# Patient Record
Sex: FEMALE | Race: OTHER | HISPANIC OR LATINO
[De-identification: names, ages, dates, MRNs, and addresses within clinical notes are randomized per-mention and may not be internally consistent; named-entity substitution may affect disease eponyms.]

---

## 2024-01-17 ENCOUNTER — NON-APPOINTMENT (OUTPATIENT)
Age: 39
End: 2024-01-17

## 2024-01-17 PROBLEM — Z00.00 ENCOUNTER FOR PREVENTIVE HEALTH EXAMINATION: Status: ACTIVE | Noted: 2024-01-17

## 2024-01-19 ENCOUNTER — OUTPATIENT (OUTPATIENT)
Dept: OUTPATIENT SERVICES | Facility: HOSPITAL | Age: 39
LOS: 1 days | End: 2024-01-19
Payer: COMMERCIAL

## 2024-01-19 ENCOUNTER — RESULT REVIEW (OUTPATIENT)
Age: 39
End: 2024-01-19

## 2024-01-19 ENCOUNTER — APPOINTMENT (OUTPATIENT)
Dept: PHYSICAL MEDICINE AND REHAB | Facility: CLINIC | Age: 39
End: 2024-01-19
Payer: COMMERCIAL

## 2024-01-19 VITALS — TEMPERATURE: 97.1 F | DIASTOLIC BLOOD PRESSURE: 81 MMHG | SYSTOLIC BLOOD PRESSURE: 119 MMHG

## 2024-01-19 DIAGNOSIS — Z87.898 PERSONAL HISTORY OF OTHER SPECIFIED CONDITIONS: ICD-10-CM

## 2024-01-19 DIAGNOSIS — Z87.09 PERSONAL HISTORY OF OTHER DISEASES OF THE RESPIRATORY SYSTEM: ICD-10-CM

## 2024-01-19 DIAGNOSIS — Z80.3 FAMILY HISTORY OF MALIGNANT NEOPLASM OF BREAST: ICD-10-CM

## 2024-01-19 DIAGNOSIS — Z80.0 FAMILY HISTORY OF MALIGNANT NEOPLASM OF DIGESTIVE ORGANS: ICD-10-CM

## 2024-01-19 DIAGNOSIS — Z82.62 FAMILY HISTORY OF OSTEOPOROSIS: ICD-10-CM

## 2024-01-19 DIAGNOSIS — Z86.79 PERSONAL HISTORY OF OTHER DISEASES OF THE CIRCULATORY SYSTEM: ICD-10-CM

## 2024-01-19 PROCEDURE — 72072 X-RAY EXAM THORAC SPINE 3VWS: CPT

## 2024-01-19 PROCEDURE — 72072 X-RAY EXAM THORAC SPINE 3VWS: CPT | Mod: 26

## 2024-01-19 PROCEDURE — 73010 X-RAY EXAM OF SHOULDER BLADE: CPT

## 2024-01-19 PROCEDURE — 73030 X-RAY EXAM OF SHOULDER: CPT

## 2024-01-19 PROCEDURE — 72052 X-RAY EXAM NECK SPINE 6/>VWS: CPT

## 2024-01-19 PROCEDURE — 99205 OFFICE O/P NEW HI 60 MIN: CPT

## 2024-01-19 PROCEDURE — 72052 X-RAY EXAM NECK SPINE 6/>VWS: CPT | Mod: 26

## 2024-01-19 PROCEDURE — 73030 X-RAY EXAM OF SHOULDER: CPT | Mod: 26,RT

## 2024-01-19 PROCEDURE — 73010 X-RAY EXAM OF SHOULDER BLADE: CPT | Mod: 26,RT

## 2024-01-19 RX ORDER — METHOCARBAMOL 750 MG/1
750 TABLET, FILM COATED ORAL 3 TIMES DAILY
Qty: 84 | Refills: 1 | Status: ACTIVE | COMMUNITY
Start: 2024-01-19 | End: 1900-01-01

## 2024-01-19 RX ORDER — SIMVASTATIN 20 MG/1
20 TABLET, FILM COATED ORAL
Refills: 0 | Status: ACTIVE | COMMUNITY

## 2024-01-19 RX ORDER — LISINOPRIL 20 MG/1
20 TABLET ORAL
Refills: 0 | Status: ACTIVE | COMMUNITY

## 2024-01-19 RX ORDER — METHYLPREDNISOLONE 4 MG/1
4 TABLET ORAL
Qty: 1 | Refills: 0 | Status: ACTIVE | COMMUNITY
Start: 2024-01-19 | End: 1900-01-01

## 2024-01-20 ENCOUNTER — TRANSCRIPTION ENCOUNTER (OUTPATIENT)
Age: 39
End: 2024-01-20

## 2024-02-02 ENCOUNTER — APPOINTMENT (OUTPATIENT)
Dept: PHYSICAL MEDICINE AND REHAB | Facility: CLINIC | Age: 39
End: 2024-02-02
Payer: COMMERCIAL

## 2024-02-02 VITALS
HEART RATE: 99 BPM | OXYGEN SATURATION: 97 % | DIASTOLIC BLOOD PRESSURE: 84 MMHG | WEIGHT: 220 LBS | TEMPERATURE: 98 F | BODY MASS INDEX: 35.36 KG/M2 | HEIGHT: 66 IN | SYSTOLIC BLOOD PRESSURE: 125 MMHG

## 2024-02-02 DIAGNOSIS — M53.84 OTHER SPECIFIED DORSOPATHIES, THORACIC REGION: ICD-10-CM

## 2024-02-02 DIAGNOSIS — M75.51 BURSITIS OF RIGHT SHOULDER: ICD-10-CM

## 2024-02-02 DIAGNOSIS — G25.89 OTHER SPECIFIED EXTRAPYRAMIDAL AND MOVEMENT DISORDERS: ICD-10-CM

## 2024-02-02 DIAGNOSIS — M54.12 RADICULOPATHY, CERVICAL REGION: ICD-10-CM

## 2024-02-02 DIAGNOSIS — M25.311 OTHER INSTABILITY, RIGHT SHOULDER: ICD-10-CM

## 2024-02-02 DIAGNOSIS — M79.18 MYALGIA, OTHER SITE: ICD-10-CM

## 2024-02-02 DIAGNOSIS — S46.819A STRAIN OF OTHER MUSCLES, FASCIA AND TENDONS AT SHOULDER AND UPPER ARM LEVEL, UNSPECIFIED ARM, INITIAL ENCOUNTER: ICD-10-CM

## 2024-02-02 PROCEDURE — 99214 OFFICE O/P EST MOD 30 MIN: CPT

## 2024-02-05 NOTE — PHYSICAL EXAM
[FreeTextEntry1] : Gen: A+O x 3 in NAD Psych: Normal mood and affect. Responds appropriately to commands Eyes: Anicteric. No discharge. EOMI. Resp: Breathing unlabored CV: radial pulses 2+ and equal. No varicosities noted Ext: No c/c/e Skin: No lesions noted   Gait: Non antalgic +  reciprocating heel to toe able to stand on toes and heels WITH hand holding/holding onto counter with both hands Tandem gait intact WITH hand holding Able to stand on either lower limb without LOB for 15 seconds Romberg negative   CN2-12 grossly intact  Inspection: Spine alignment is midline, with no evidence of scoliosis. Iliac crest heights and PSIS heights level.  + Forward head position.   Palpation: There is + tenderness over the upper traps, middle traps, levator scaps, rhomboids, articular pillars, cervical paraspinals, B/L   Cervical ROM: Flexion, extension, side-bending, rotation, limited due to pain with most pain at terminal ROM Finger to nose bilaterally intact    C5 (Shoulder Abduction)        C5 (Elbow Flex)         C6 (Wrist Ext)  Right 3-4/5                                 4/5                           5/5       Left 5/5                                 5/5                           5/5             C7 (Elbow Ext)            C7 (Wrist Flex)             C8 (Long Finger Flex)          T1 (Finger Abduct)          Right 4/5                     5/5                                      5/5                                       5/5                                                 Left 5/5                     5/5                                      5/5                                       5/5                                  C8 (Thumb Ext)            C8 & T1 (Thumb Opp)           Right 5/5                            5/5                                                 Left 5/5                            5/5                              Tone: Normal. No cog wheeling. Proprioception at DIPs intact B/L Sensation: Grossly intact to light touch and pinprick bilateral upper extremities. Reflexes: 2+ symmetric biceps, pronators, brachioradialis, triceps Hoffmans Sign negative Spurling's Sign negative Shoulder Abduction Test (Bakody's) absent Lhermittes Sign negative   RIGHT SHOULDER: neg effusion neg scars or lacerations or lesions neg increased warmth neg scapular winging + scapular dyskinesis (Type 2) neg muscle atrophy   Palpation: no TTP over sterna-clavicular joint no TTP over clavicle no TTP over coracoid process no TTP over sternum no TTP over AC joint no TTP over humerus + TTP over the spine of the scapula + TTP over the medial border of the scapula, superior angle, inferior angle, lateral border + TTP over supraspinatus no TTP over infraspinatus + TTP over upper trapezius + TTP over deltoid muscle no TTP in the axilla + crepitus with PROM shoulder   AROM: Active range of motion limited in most planes as below Scapulohumeral rhythm abnormal: + slowed, interrupted, aided by compensatory movements contralaterally as below   Abduction 140/170-180 Forward Flexion 140/170-180 Elevation through the plane of the scapula 140/170-180 External Rotation 40/80-90 Internal Rotation 40/ Extension 25/50-60 Adduction 30/50-75   PROM consistent with above   Assessment of access (for OVERHEAD/INCLINE PRESS) on the right demonstrates inability to forward flex, externally rotate shoulder beyond 130 degrees without compensatory: + thoracic extension + thoracic rotation of the contralateral side towards the ipsilateral shoulder + postural kyphosis    neg sulcus sign + Neer test + Ellis test neg Yocums test + Speeds test neg Yergasons test + drop arm test + empty can test neg Sandoval's Test neg external rotation lag sign neg lift off sign neg belly press neg hornblowers sign neg Horizontal adduction test   Sensation to light touch intact over all dermatomes about the shoulder   Distal pulses present     LEFT SHOULDER neg effusion neg scars or lacerations or lesions neg increased warmth neg scapular winging neg scapular dyskinesis (Type 2) neg muscle atroph   Palpation: no TTP over sterna-clavicular joint no TTP over clavicle no TTP over coracoid process no TTP over sternum no TTP over AC joint no TTP over humerus no TTP over the spine of the scapula no TTP over the medial border of the scapula, superior angle, inferior angle, lateral border no TTP over supraspinatus no TTP over infraspinatus no TTP over upper trapezius no TTP over deltoid muscle no TTP in the axilla neg crepitus with PROM shoulder   AROM: Active range of motion full and painless Scapulohumeral rhythm was smooth, appropriate   Active range of motion was near functional limits but with pain at terminal ROM in most planes   Active range of motion limited in most planes as below Scapulohumeral rhythm abnormal: + slowed, interrupted, aided by compensatory movements contralaterally as below   Abduction /170-180 Forward Flexion /160-180 Elevation through the plane of the scapula /170-180 External Rotation /80-90 Internal Rotation / Extension /50-60 Adduction /50-75   PROM consistent with above   Assessment of access (for OVERHEAD/INCLINE PRESS) on the LEFT demonstrates inability to forward flex, externally rotate shoulder beyond 130 degrees without compensatory: + thoracic extension + thoracic rotation of the contralateral side towards the ipsilateral shoulder + postural kyphosis   neg sulcus sign neg Neer test neg Ellis test neg Yocums test neg Speeds test neg Yergasons test neg drop arm test neg empty can test neg Sandoval's Test neg external rotation lag sign neg lift off sign neg belly press neg hornblowers sign neg Horizontal adduction test   Sensation to light touch intact over all dermatomes about the shoulder Distal pulses present   Manual Muscle Testing     C5 (Shoulder Abduction)        C5 (Elbow Flex)         C6 (Wrist Ext)   Right 5/5                                 5/5                           5/5       Left 5/5                                 5/5                           5/5             C7 (Elbow Ext)            C7 (Wrist Flex)             C8 (Long Finger Flex)          T1 (Finger Abduct)          Right 5/5                     5/5                                      5/5                                       5/5                                                 Left 5/5                     5/5                                      5/5                                       5/5                                   C8 (Thumb Ext)            C8 & T1 (Thumb Opp)           Right 5/5                            5/5                                                 Left 5/5                            5/5                              Resisted Internal Rotation Right 4/5 Left 4/5   Resisted External Rotation   Right 4/5 Left 4/5   Prone Shoulder extension   Right 4/5  Left 4/5   Prone Shoulder (Coronal) Abduction Right 4/5 Left 4/5

## 2024-02-05 NOTE — ASSESSMENT
[FreeTextEntry1] :                                                       Assessment/Plan:   ALDO ALEXANDER is a 38 year female with right lower cervical, upper thoracic here for initial consultation.  Shoulder instability, right Scapulothoracic bursitis, right Radiculitis, cervical Scapular dyskinesis, type 3, right Decreased range of motion of thoracic spine  H/O HTN  H/O DM  - Tiers of treatment and management of above diagnosis(es) were discussed with patient - Optimal diet, weight, sleep, and lifestyle management to minimize stress and maximize well being counseling provided - Imaging reviewed and discussed with patient - Patient was advised to start a structured, targeted therapy program 2-3x/wk for 8 wks with goal toward HEP - Patient was educated on an appropriate home exercise program, provided with exercise recommendations, all questions answered - Patient was prescribed methocarbamol 750mg 1-2 tabs up to TID PRN muscle spasm, informed of sedative potential, advised to avoid driving, taking the subway, or operating heavy machinery, patient displayed a clear understanding of the plan including medication, its purpose and side effects, all questions answered - Patient may trial acetaminophen 1000mg up to TID PRN moderate to severe pain and to decrease average consumption of NSAIDs - Writer provided patient with written educational materials for patient's review regarding trigger point injections, we may consider offering them at future visits, all questions were answered - Patient was advised to apply cool compresses or warm heat to affected regions PRN - Radiographs of cervical, thoracic, and b/l shoulder ordered today   - Patient was prescribed medrol dose pack (x1) with written instructions, all questions answered, informed of side effects of the medication.  Possible side effects, including hyperglycemia, GI upset, and GI bleed, reviewed with patient. In agreement with patient that potential pain reduction and anti-inflammatory benefits currently outweigh known side effect profile for oral corticosteroids. Patient instructed to immediately stop medication should she develop any abdominal pain, nausea, vomiting, bloody stools, or BRBPR   - Educated about red flag symptoms including (but not limited to) new, worsened, or persistent: fever greater than 100F, bowel or bladder incontinence, bowel obstipation, inability to void urine, urinary leakage, Severe nausea or vomiting, Worsening numbness, worsening tingling/paresthesias, and/or new or progressive motor weakness; advised to seek immediate medical attention at his nearest Emergency department should they experience any of the above    - Follow up in 2-3 weeks after imaging, assess effect of the medication, assess readiness for trigger point injections   I have personally spent a total of at least 60 minutes preparing, reviewing internal and external records, explaining, counseling, providing necessary information via documented paperwork for this encounter, and coordinating care for this patient encounter.    Thank you, (s), for allowing me to participate in the care of your patient. Please do not hesitate to contact me with questions/concerns.   Buzz Morfin M.D. Sports and Interventional Spine Department of Physical Medicine and Rehabilitation [facility] [facility] [address] [place] [address]   Appointments: [phone] Fax: [phone]
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
## 2024-03-01 ENCOUNTER — APPOINTMENT (OUTPATIENT)
Dept: PHYSICAL MEDICINE AND REHAB | Facility: CLINIC | Age: 39
End: 2024-03-01

## 2024-03-11 PROBLEM — M75.51 SCAPULOTHORACIC BURSITIS OF RIGHT SHOULDER: Status: ACTIVE | Noted: 2024-01-19

## 2024-03-11 PROBLEM — G25.89 SCAPULAR DYSKINESIS: Status: ACTIVE | Noted: 2024-01-19

## 2024-03-11 PROBLEM — M25.311 INSTABILITY OF RIGHT SHOULDER JOINT: Status: ACTIVE | Noted: 2024-01-19

## 2024-03-11 PROBLEM — M79.18 MYOFASCIAL PAIN SYNDROME: Status: ACTIVE | Noted: 2024-03-11

## 2024-03-11 PROBLEM — M54.12 RADICULITIS, CERVICAL: Status: ACTIVE | Noted: 2024-01-19

## 2024-03-11 PROBLEM — M53.84 DECREASED RANGE OF MOTION OF INTERVERTEBRAL DISCS OF THORACIC SPINE: Status: ACTIVE | Noted: 2024-01-19

## 2024-03-11 PROBLEM — S46.819A STRAIN OF TRAPEZIUS MUSCLE: Status: ACTIVE | Noted: 2024-03-11

## 2024-03-11 NOTE — PHYSICAL EXAM
[FreeTextEntry1] : Gen: A+O x 3 in NAD Psych: Normal mood and affect. Responds appropriately to commands Eyes: Anicteric. No discharge. EOMI. Resp: Breathing unlabored CV: radial pulses 2+ and equal. No varicosities noted Ext: No c/c/e Skin: No lesions noted   Gait: Non antalgic +  reciprocating heel to toe able to stand on toes and heels WITH hand holding/holding onto counter with both hands Tandem gait intact WITH hand holding Able to stand on either lower limb without LOB for 15 seconds Romberg negative   CN2-12 grossly intact  Inspection: Spine alignment is midline, with no evidence of scoliosis. Iliac crest heights and PSIS heights level.  + Forward head position.   Palpation: There is + tenderness over the upper traps, middle traps, levator scaps, rhomboids, articular pillars, cervical paraspinals, B/L   Cervical ROM: Flexion, extension, side-bending, rotation, limited due to pain with most pain at terminal ROM Finger to nose bilaterally intact    C5 (Shoulder Abduction)        C5 (Elbow Flex)         C6 (Wrist Ext)  Right 3-4/5                                 4/5                           5/5       Left 5/5                                 5/5                           5/5             C7 (Elbow Ext)            C7 (Wrist Flex)             C8 (Long Finger Flex)          T1 (Finger Abduct)          Right 4/5                     5/5                                      5/5                                       5/5                                                 Left 5/5                     5/5                                      5/5                                       5/5                                  C8 (Thumb Ext)            C8 & T1 (Thumb Opp)           Right 5/5                            5/5                                                 Left 5/5                            5/5                              Tone: Normal. No cog wheeling. Proprioception at DIPs intact B/L Sensation: Grossly intact to light touch and pinprick bilateral upper extremities. Reflexes: 2+ symmetric biceps, pronators, brachioradialis, triceps Hoffmans Sign negative Spurling's Sign negative Shoulder Abduction Test (Bakody's) absent Lhermittes Sign negative   RIGHT SHOULDER: neg effusion neg scars or lacerations or lesions neg increased warmth neg scapular winging + scapular dyskinesis (Type 2) neg muscle atrophy   Palpation: no TTP over sterna-clavicular joint no TTP over clavicle no TTP over coracoid process no TTP over sternum no TTP over AC joint no TTP over humerus now 02/02/2024 negative + TTP over the spine of the scapula now 02/02/2024 negative + TTP over the medial border of the scapula, superior angle, inferior angle, lateral border now 02/02/2024 negative + TTP over supraspinatus no TTP over infraspinatus Now 02/02/2024 improved but persistent + + TTP over upper trapezius Now 02/02/2024 improved but persistent + + TTP over deltoid muscle no TTP in the axilla Now 02/02/2024 improved but persistent + + crepitus with PROM shoulder   AROM: Active range of motion limited in most planes as below Scapulohumeral rhythm abnormal: + slowed, interrupted, aided by compensatory movements contralaterally as below   Abduction 160/170-180 Forward Flexion 160/170-180 Elevation through the plane of the scapula 160/170-180 External Rotation 60/80-90 Internal Rotation 60/ Extension 35/50-60 Adduction 40/50-75   PROM consistent with above   Assessment of access (for OVERHEAD/INCLINE PRESS) on the right demonstrates inability to forward flex, externally rotate shoulder beyond 130 degrees without compensatory: + thoracic extension + thoracic rotation of the contralateral side towards the ipsilateral shoulder + postural kyphosis    neg sulcus sign now 02/02/2024 negative Neer test Now 02/02/2024 improved but persistent +  Ellis test neg Yocums test Now 02/02/2024 improved but persistent + Speeds test neg Yergasons test Now 02/02/2024 improved but persistent +  drop arm test Now 02/02/2024 improved but persistent +  empty can test neg [place]'s Test neg external rotation lag sign neg lift off sign neg belly press neg hornblowers sign neg Horizontal adduction test   Sensation to light touch intact over all dermatomes about the shoulder   Distal pulses present     LEFT SHOULDER neg effusion neg scars or lacerations or lesions neg increased warmth neg scapular winging neg scapular dyskinesis (Type 2) neg muscle atroph   Palpation: no TTP over sterna-clavicular joint no TTP over clavicle no TTP over coracoid process no TTP over sternum no TTP over AC joint no TTP over humerus no TTP over the spine of the scapula no TTP over the medial border of the scapula, superior angle, inferior angle, lateral border no TTP over supraspinatus no TTP over infraspinatus no TTP over upper trapezius no TTP over deltoid muscle no TTP in the axilla neg crepitus with PROM shoulder   AROM: Active range of motion full and painless Scapulohumeral rhythm was smooth, appropriate   Active range of motion was near functional limits but with pain at terminal ROM in most planes   Active range of motion limited in most planes as below Scapulohumeral rhythm abnormal: + slowed, interrupted, aided by compensatory movements contralaterally as below   Abduction /170-180 Forward Flexion /160-180 Elevation through the plane of the scapula /170-180 External Rotation /80-90 Internal Rotation / Extension /50-60 Adduction /50-75   PROM consistent with above   Assessment of access (for OVERHEAD/INCLINE PRESS) on the LEFT demonstrates inability to forward flex, externally rotate shoulder beyond 130 degrees without compensatory: + thoracic extension + thoracic rotation of the contralateral side towards the ipsilateral shoulder + postural kyphosis   neg sulcus sign neg Neer test neg Ellis test neg Yocums test neg Speeds test neg Yergasons test neg drop arm test neg empty can test neg [place]'s Test neg external rotation lag sign neg lift off sign neg belly press neg hornblowers sign neg Horizontal adduction test   Sensation to light touch intact over all dermatomes about the shoulder Distal pulses present   Manual Muscle Testing     C5 (Shoulder Abduction)        C5 (Elbow Flex)         C6 (Wrist Ext)   Right 5/5                                 5/5                           5/5       Left 5/5                                 5/5                           5/5             C7 (Elbow Ext)            C7 (Wrist Flex)             C8 (Long Finger Flex)          T1 (Finger Abduct)          Right 5/5                     5/5                                      5/5                                       5/5                                                 Left 5/5                     5/5                                      5/5                                       5/5                                   C8 (Thumb Ext)            C8 & T1 (Thumb Opp)           Right 5/5                            5/5                                                 Left 5/5                            5/5                              Resisted Internal Rotation Right 4/5 Left 4/5   Resisted External Rotation   Right 4/5 Left 4/5   Prone Shoulder extension   Right 4/5  Left 4/5   Prone Shoulder (Coronal) Abduction Right 4/5 Left 4/5
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
## 2024-03-11 NOTE — ASSESSMENT
[FreeTextEntry1] :                                                       Assessment/Plan:   ALDO ALEXANDER is a 38 year female with right lower cervical, upper thoracic here for follow up   Shoulder instability, right Scapulothoracic bursitis, right Radiculitis, cervical Scapular dyskinesis, type 3, right Decreased range of motion of thoracic spine  H/O HTN  H/O DM  - Tiers of treatment and management of above diagnosis(es) were discussed with patient - Optimal diet, weight, sleep, and lifestyle management to minimize stress and maximize well being counseling provided - Imaging reviewed and discussed with patient - Patient was advised to start a structured, targeted therapy program 2-3x/wk for 8 wks with goal toward HEP - Patient was educated on an appropriate home exercise program, provided with exercise recommendations, all questions answered - Patient was prescribed methocarbamol 750mg 1-2 tabs up to TID PRN muscle spasm, informed of sedative potential, advised to avoid driving, taking the subway, or operating heavy machinery, patient displayed a clear understanding of the plan including medication, its purpose and side effects, all questions answered - Patient may trial acetaminophen 1000mg up to TID PRN moderate to severe pain and to decrease average consumption of NSAIDs - Writer provided patient with written educational materials for patient's review regarding trigger point injections, we may consider offering them at future visits, all questions were answered - Patient was advised to apply cool compresses or warm heat to affected regions PRN - Radiographs of cervical, thoracic, and b/l shoulder ordered at initial visit, reviewed including Feb 02, 2024    - Educated about red flag symptoms including (but not limited to) new, worsened, or persistent: fever greater than 100F, bowel or bladder incontinence, bowel obstipation, inability to void urine, urinary leakage, Severe nausea or vomiting, Worsening numbness, worsening tingling/paresthesias, and/or new or progressive motor weakness; advised to seek immediate medical attention at his nearest Emergency department should they experience any of the above    - Follow up in 1-2 months in person for possible right upper back trigger point injections if needed   I have personally spent a total of at least 35 minutes preparing, reviewing internal and external records, explaining, counseling, providing necessary information via documented paperwork for this encounter, and coordinating care for this patient encounter.    Thank you, (s), for allowing me to participate in the care of your patient. Please do not hesitate to contact me with questions/concerns.   Buzz Morfin M.D. Sports and Interventional Spine Department of Physical Medicine and Rehabilitation [facility] [place] [facility] Physician [facility] [address] [place], [place] [id number]   Appointments: [phone] Fax: [phone]